# Patient Record
Sex: FEMALE | URBAN - METROPOLITAN AREA
[De-identification: names, ages, dates, MRNs, and addresses within clinical notes are randomized per-mention and may not be internally consistent; named-entity substitution may affect disease eponyms.]

---

## 2022-11-23 ENCOUNTER — APPOINTMENT (OUTPATIENT)
Dept: URBAN - METROPOLITAN AREA CLINIC 273 | Age: 72
Setting detail: DERMATOLOGY
End: 2022-11-23

## 2022-11-23 DIAGNOSIS — L82.0 INFLAMED SEBORRHEIC KERATOSIS: ICD-10-CM

## 2022-11-23 DIAGNOSIS — L71.8 OTHER ROSACEA: ICD-10-CM

## 2022-11-23 PROCEDURE — 17110 DESTRUCT B9 LESION 1-14: CPT | Mod: 52

## 2022-11-23 PROCEDURE — OTHER SUNSCREEN RECOMMENDATIONS: OTHER

## 2022-11-23 PROCEDURE — OTHER REASSURANCE: OTHER

## 2022-11-23 PROCEDURE — OTHER COUNSELING: OTHER

## 2022-11-23 PROCEDURE — OTHER PRESCRIPTION SAMPLES PROVIDED: OTHER

## 2022-11-23 PROCEDURE — OTHER LIQUID NITROGEN: OTHER

## 2022-11-23 PROCEDURE — OTHER GENTLE SKIN CARE INSTRUCTIONS: OTHER

## 2022-11-23 PROCEDURE — 99203 OFFICE O/P NEW LOW 30 MIN: CPT | Mod: 25

## 2022-11-23 ASSESSMENT — LOCATION DETAILED DESCRIPTION DERM
LOCATION DETAILED: RIGHT CENTRAL MALAR CHEEK
LOCATION DETAILED: RIGHT LATERAL MALAR CHEEK
LOCATION DETAILED: LEFT LATERAL MALAR CHEEK
LOCATION DETAILED: LEFT SUPERIOR LATERAL FOREHEAD

## 2022-11-23 ASSESSMENT — LOCATION ZONE DERM: LOCATION ZONE: FACE

## 2022-11-23 ASSESSMENT — LOCATION SIMPLE DESCRIPTION DERM
LOCATION SIMPLE: LEFT CHEEK
LOCATION SIMPLE: LEFT FOREHEAD
LOCATION SIMPLE: RIGHT CHEEK

## 2022-11-23 NOTE — PROCEDURE: REASSURANCE
Additional Note: Not cancerous, not concerning
Detail Level: Zone
Hide Include Location In Plan Question?: No

## 2022-11-23 NOTE — PROCEDURE: LIQUID NITROGEN
Render Note In Bullet Format When Appropriate: No
Medical Necessity Clause: This procedure was medically necessary because the lesions that were treated were:
Medical Necessity Information: It is in your best interest to select a reason for this procedure from the list below. All of these items fulfill various CMS LCD requirements except the new and changing color options.
Post-Care Instructions: I reviewed with the patient in detail post-care instructions. Patient is to wear sunprotection, and avoid picking at any of the treated lesions. Pt may apply Vaseline to crusted or scabbing areas.
Detail Level: Detailed
Spray Paint Text: The liquid nitrogen was applied to the skin utilizing a spray paint frosting technique.
Consent: The patient's consent was obtained including but not limited to risks of crusting, scabbing, blistering, scarring, darker or lighter pigmentary change, recurrence, incomplete removal and infection.
Show Applicator Variable?: Yes

## 2022-12-14 ENCOUNTER — APPOINTMENT (OUTPATIENT)
Dept: URBAN - METROPOLITAN AREA CLINIC 273 | Age: 72
Setting detail: DERMATOLOGY
End: 2022-12-20

## 2022-12-14 DIAGNOSIS — Z71.89 OTHER SPECIFIED COUNSELING: ICD-10-CM

## 2022-12-14 DIAGNOSIS — L82.0 INFLAMED SEBORRHEIC KERATOSIS: ICD-10-CM

## 2022-12-14 DIAGNOSIS — L71.8 OTHER ROSACEA: ICD-10-CM

## 2022-12-14 PROCEDURE — OTHER COUNSELING: OTHER

## 2022-12-14 PROCEDURE — 99213 OFFICE O/P EST LOW 20 MIN: CPT | Mod: 25

## 2022-12-14 PROCEDURE — 17110 DESTRUCT B9 LESION 1-14: CPT | Mod: 52

## 2022-12-14 PROCEDURE — OTHER PRESCRIPTION: OTHER

## 2022-12-14 PROCEDURE — OTHER LIQUID NITROGEN: OTHER

## 2022-12-14 RX ORDER — OXYMETAZOLINE HYDROCHLORIDE 1 G/100G
CREAM TOPICAL
Qty: 30 | Refills: 3 | Status: ERX | COMMUNITY
Start: 2022-12-14

## 2022-12-14 ASSESSMENT — LOCATION ZONE DERM: LOCATION ZONE: FACE

## 2022-12-14 ASSESSMENT — LOCATION DETAILED DESCRIPTION DERM
LOCATION DETAILED: LEFT CENTRAL MALAR CHEEK
LOCATION DETAILED: LEFT INFERIOR MEDIAL FOREHEAD
LOCATION DETAILED: RIGHT SUPERIOR LATERAL MALAR CHEEK
LOCATION DETAILED: LEFT INFERIOR FOREHEAD

## 2022-12-14 ASSESSMENT — LOCATION SIMPLE DESCRIPTION DERM
LOCATION SIMPLE: LEFT CHEEK
LOCATION SIMPLE: LEFT FOREHEAD
LOCATION SIMPLE: RIGHT CHEEK

## 2022-12-14 NOTE — PROCEDURE: LIQUID NITROGEN
Show Topical Anesthesia Variable?: Yes
Render Note In Bullet Format When Appropriate: No
Medical Necessity Information: It is in your best interest to select a reason for this procedure from the list below. All of these items fulfill various CMS LCD requirements except the new and changing color options.
Post-Care Instructions: I reviewed with the patient in detail post-care instructions. Patient is to wear sunprotection, and avoid picking at any of the treated lesions. Pt may apply Vaseline to crusted or scabbing areas.
Medical Necessity Clause: This procedure was medically necessary because the lesions that were treated were:
Spray Paint Text: The liquid nitrogen was applied to the skin utilizing a spray paint frosting technique.
Detail Level: Detailed
Consent: The patient's consent was obtained including but not limited to risks of crusting, scabbing, blistering, scarring, darker or lighter pigmentary change, recurrence, incomplete removal and infection.

## 2023-01-11 ENCOUNTER — APPOINTMENT (OUTPATIENT)
Dept: URBAN - METROPOLITAN AREA CLINIC 273 | Age: 73
Setting detail: DERMATOLOGY
End: 2023-01-11

## 2023-01-11 DIAGNOSIS — L82.0 INFLAMED SEBORRHEIC KERATOSIS: ICD-10-CM

## 2023-01-11 DIAGNOSIS — L71.8 OTHER ROSACEA: ICD-10-CM

## 2023-01-11 PROCEDURE — 17110 DESTRUCT B9 LESION 1-14: CPT | Mod: 52

## 2023-01-11 PROCEDURE — OTHER LIQUID NITROGEN: OTHER

## 2023-01-11 PROCEDURE — OTHER PRESCRIPTION: OTHER

## 2023-01-11 PROCEDURE — OTHER PRESCRIPTION MEDICATION MANAGEMENT: OTHER

## 2023-01-11 PROCEDURE — 99213 OFFICE O/P EST LOW 20 MIN: CPT | Mod: 25

## 2023-01-11 PROCEDURE — OTHER COUNSELING: OTHER

## 2023-01-11 ASSESSMENT — LOCATION DETAILED DESCRIPTION DERM
LOCATION DETAILED: RIGHT INFERIOR CENTRAL MALAR CHEEK
LOCATION DETAILED: RIGHT SUPERIOR MEDIAL BUCCAL CHEEK
LOCATION DETAILED: LEFT LATERAL MALAR CHEEK
LOCATION DETAILED: LEFT INFERIOR CENTRAL MALAR CHEEK
LOCATION DETAILED: LEFT CENTRAL BUCCAL CHEEK
LOCATION DETAILED: RIGHT CENTRAL MALAR CHEEK

## 2023-01-11 ASSESSMENT — LOCATION SIMPLE DESCRIPTION DERM
LOCATION SIMPLE: RIGHT CHEEK
LOCATION SIMPLE: LEFT CHEEK

## 2023-01-11 ASSESSMENT — LOCATION ZONE DERM: LOCATION ZONE: FACE

## 2023-01-11 NOTE — PROCEDURE: LIQUID NITROGEN
Detail Level: Detailed
Spray Paint Technique: No
Show Aperture Variable?: Yes
Medical Necessity Information: It is in your best interest to select a reason for this procedure from the list below. All of these items fulfill various CMS LCD requirements except the new and changing color options.
Spray Paint Text: The liquid nitrogen was applied to the skin utilizing a spray paint frosting technique.
Post-Care Instructions: I reviewed with the patient in detail post-care instructions. Patient is to wear sunprotection, and avoid picking at any of the treated lesions. Pt may apply Vaseline to crusted or scabbing areas.
Medical Necessity Clause: This procedure was medically necessary because the lesions that were treated were:
Consent: The patient's consent was obtained including but not limited to risks of crusting, scabbing, blistering, scarring, darker or lighter pigmentary change, recurrence, incomplete removal and infection.

## 2023-01-11 NOTE — PROCEDURE: PRESCRIPTION MEDICATION MANAGEMENT
Initiate Treatment: Oxymetazoline 0.75% thru [facility]
Discontinue Regimen: Rhofade (patient never received)
Render In Strict Bullet Format?: No
Detail Level: Zone

## 2023-02-07 ENCOUNTER — APPOINTMENT (OUTPATIENT)
Dept: URBAN - METROPOLITAN AREA CLINIC 273 | Age: 73
Setting detail: DERMATOLOGY
End: 2023-02-09

## 2023-02-07 DIAGNOSIS — L82.0 INFLAMED SEBORRHEIC KERATOSIS: ICD-10-CM

## 2023-02-07 PROCEDURE — 99212 OFFICE O/P EST SF 10 MIN: CPT

## 2023-02-07 PROCEDURE — OTHER COUNSELING: OTHER

## 2023-02-07 PROCEDURE — OTHER ADDITIONAL NOTES: OTHER

## 2023-02-07 ASSESSMENT — LOCATION SIMPLE DESCRIPTION DERM: LOCATION SIMPLE: RIGHT CHEEK

## 2023-02-07 ASSESSMENT — LOCATION DETAILED DESCRIPTION DERM: LOCATION DETAILED: RIGHT INFERIOR LATERAL MALAR CHEEK

## 2023-02-07 ASSESSMENT — LOCATION ZONE DERM: LOCATION ZONE: FACE

## 2023-02-07 NOTE — PROCEDURE: ADDITIONAL NOTES
Additional Notes: Pt will be using skin medicinals hydroquinone lightening cream
Detail Level: Simple
Render Risk Assessment In Note?: no

## 2023-03-07 ENCOUNTER — APPOINTMENT (OUTPATIENT)
Dept: URBAN - METROPOLITAN AREA CLINIC 273 | Age: 73
Setting detail: DERMATOLOGY
End: 2023-03-11

## 2023-03-07 DIAGNOSIS — L82.0 INFLAMED SEBORRHEIC KERATOSIS: ICD-10-CM

## 2023-03-07 DIAGNOSIS — L71.8 OTHER ROSACEA: ICD-10-CM

## 2023-03-07 PROCEDURE — 99213 OFFICE O/P EST LOW 20 MIN: CPT

## 2023-03-07 PROCEDURE — OTHER COUNSELING: OTHER

## 2023-03-07 PROCEDURE — OTHER PRESCRIPTION MEDICATION MANAGEMENT: OTHER

## 2023-03-07 ASSESSMENT — LOCATION DETAILED DESCRIPTION DERM
LOCATION DETAILED: RIGHT SUPERIOR LATERAL MALAR CHEEK
LOCATION DETAILED: RIGHT LATERAL MALAR CHEEK
LOCATION DETAILED: RIGHT INFERIOR CENTRAL MALAR CHEEK
LOCATION DETAILED: LEFT INFERIOR MEDIAL MALAR CHEEK

## 2023-03-07 ASSESSMENT — LOCATION SIMPLE DESCRIPTION DERM
LOCATION SIMPLE: RIGHT CHEEK
LOCATION SIMPLE: LEFT CHEEK

## 2023-03-07 ASSESSMENT — LOCATION ZONE DERM: LOCATION ZONE: FACE

## 2023-03-07 NOTE — PROCEDURE: PRESCRIPTION MEDICATION MANAGEMENT
Initiate Treatment: Oxymetazoline 0.75% cream ( rhofade) use q am
Continue Regimen: Hydroquinone lightening cream 12%
Detail Level: Zone
Render In Strict Bullet Format?: No